# Patient Record
Sex: MALE | Race: WHITE | Employment: UNEMPLOYED | URBAN - METROPOLITAN AREA
[De-identification: names, ages, dates, MRNs, and addresses within clinical notes are randomized per-mention and may not be internally consistent; named-entity substitution may affect disease eponyms.]

---

## 2017-12-07 ENCOUNTER — APPOINTMENT (OUTPATIENT)
Dept: MRI IMAGING | Age: 42
End: 2017-12-07
Payer: MEDICARE

## 2017-12-07 ENCOUNTER — APPOINTMENT (OUTPATIENT)
Dept: GENERAL RADIOLOGY | Age: 42
End: 2017-12-07
Payer: MEDICARE

## 2017-12-07 ENCOUNTER — HOSPITAL ENCOUNTER (EMERGENCY)
Age: 42
Discharge: HOME | End: 2017-12-07
Attending: EMERGENCY MEDICINE
Payer: MEDICARE

## 2017-12-07 VITALS
HEART RATE: 94 BPM | BODY MASS INDEX: 30.81 KG/M2 | SYSTOLIC BLOOD PRESSURE: 138 MMHG | WEIGHT: 240 LBS | RESPIRATION RATE: 16 BRPM | OXYGEN SATURATION: 100 % | DIASTOLIC BLOOD PRESSURE: 84 MMHG | TEMPERATURE: 97.6 F

## 2017-12-07 DIAGNOSIS — M54.30 SCIATICA, UNSPECIFIED LATERALITY: Primary | ICD-10-CM

## 2017-12-07 PROCEDURE — 72146 MRI CHEST SPINE W/O DYE: CPT

## 2017-12-07 PROCEDURE — 96372 THER/PROPH/DIAG INJ SC/IM: CPT

## 2017-12-07 PROCEDURE — 6360000002 HC RX W HCPCS: Performed by: PHYSICIAN ASSISTANT

## 2017-12-07 PROCEDURE — 72100 X-RAY EXAM L-S SPINE 2/3 VWS: CPT

## 2017-12-07 PROCEDURE — 72148 MRI LUMBAR SPINE W/O DYE: CPT

## 2017-12-07 PROCEDURE — 99284 EMERGENCY DEPT VISIT MOD MDM: CPT

## 2017-12-07 PROCEDURE — 72072 X-RAY EXAM THORAC SPINE 3VWS: CPT

## 2017-12-07 PROCEDURE — 6370000000 HC RX 637 (ALT 250 FOR IP): Performed by: EMERGENCY MEDICINE

## 2017-12-07 RX ORDER — KETOROLAC TROMETHAMINE 30 MG/ML
60 INJECTION, SOLUTION INTRAMUSCULAR; INTRAVENOUS ONCE
Status: COMPLETED | OUTPATIENT
Start: 2017-12-07 | End: 2017-12-07

## 2017-12-07 RX ORDER — ORPHENADRINE CITRATE 30 MG/ML
60 INJECTION INTRAMUSCULAR; INTRAVENOUS ONCE
Status: COMPLETED | OUTPATIENT
Start: 2017-12-07 | End: 2017-12-07

## 2017-12-07 RX ORDER — HYDROMORPHONE HCL 110MG/55ML
1 PATIENT CONTROLLED ANALGESIA SYRINGE INTRAVENOUS ONCE
Status: COMPLETED | OUTPATIENT
Start: 2017-12-07 | End: 2017-12-07

## 2017-12-07 RX ORDER — DEXAMETHASONE SODIUM PHOSPHATE 10 MG/ML
10 INJECTION INTRAMUSCULAR; INTRAVENOUS ONCE
Status: COMPLETED | OUTPATIENT
Start: 2017-12-07 | End: 2017-12-07

## 2017-12-07 RX ORDER — DEXAMETHASONE SODIUM PHOSPHATE 4 MG/ML
10 INJECTION, SOLUTION INTRA-ARTICULAR; INTRALESIONAL; INTRAMUSCULAR; INTRAVENOUS; SOFT TISSUE ONCE
Status: DISCONTINUED | OUTPATIENT
Start: 2017-12-07 | End: 2017-12-07 | Stop reason: SDUPTHER

## 2017-12-07 RX ORDER — LIDOCAINE 50 MG/G
1 PATCH TOPICAL ONCE
Status: DISCONTINUED | OUTPATIENT
Start: 2017-12-07 | End: 2017-12-07 | Stop reason: HOSPADM

## 2017-12-07 RX ORDER — METHYLPREDNISOLONE 4 MG/1
TABLET ORAL
Qty: 1 KIT | Refills: 0 | Status: SHIPPED | OUTPATIENT
Start: 2017-12-07

## 2017-12-07 RX ORDER — TRAMADOL HYDROCHLORIDE 50 MG/1
50 TABLET ORAL EVERY 6 HOURS PRN
Qty: 20 TABLET | Refills: 0 | Status: SHIPPED | OUTPATIENT
Start: 2017-12-07 | End: 2017-12-17

## 2017-12-07 RX ADMIN — DEXAMETHASONE SODIUM PHOSPHATE 10 MG: 10 INJECTION INTRAMUSCULAR; INTRAVENOUS at 16:56

## 2017-12-07 RX ADMIN — ORPHENADRINE CITRATE 60 MG: 30 INJECTION INTRAMUSCULAR; INTRAVENOUS at 16:00

## 2017-12-07 RX ADMIN — KETOROLAC TROMETHAMINE 60 MG: 30 INJECTION, SOLUTION INTRAMUSCULAR at 16:00

## 2017-12-07 RX ADMIN — HYDROMORPHONE HYDROCHLORIDE 1 MG: 2 INJECTION, SOLUTION INTRAMUSCULAR; INTRAVENOUS; SUBCUTANEOUS at 16:55

## 2017-12-07 ASSESSMENT — PAIN SCALES - GENERAL
PAINLEVEL_OUTOF10: 10
PAINLEVEL_OUTOF10: 10
PAINLEVEL_OUTOF10: 8

## 2017-12-07 ASSESSMENT — PAIN DESCRIPTION - ORIENTATION: ORIENTATION: RIGHT;LOWER

## 2017-12-07 ASSESSMENT — PAIN DESCRIPTION - FREQUENCY: FREQUENCY: CONTINUOUS

## 2017-12-07 ASSESSMENT — PAIN DESCRIPTION - PAIN TYPE: TYPE: ACUTE PAIN

## 2017-12-07 ASSESSMENT — PAIN DESCRIPTION - DESCRIPTORS: DESCRIPTORS: PRESSURE;SHARP;SHOOTING;STABBING

## 2017-12-07 ASSESSMENT — PAIN DESCRIPTION - LOCATION: LOCATION: BACK

## 2017-12-07 NOTE — ED PROVIDER NOTES
[address]  eMERGENCY dEPARTMENT eNCOUnter      Pt Name: Alejandra Mcginnis  MRN: [id number]  [place] 1975  Date of evaluation: 12/7/2017  Provider: Radha Garcia PA-C    CHIEF COMPLAINT       Chief Complaint   Patient presents with    Back Pain     right lower back           HISTORY OF PRESENT ILLNESS  (Location/Symptom, Timing/Onset, Context/Setting, Quality, Duration, Modifying Factors, Severity.)   Alejandra Mcginnis is a 40 y.o. male who presents to the emergency department via EMS With complaints of lower back pain. Patient states this morning at 7:15 he coughed and felt a pop in his lower back. Patient states he had immediate onset pain in his thoracic and lumbar region that radiates into his right hip and down his leg. Patient states he is unable to bear any weight on the right leg as it will give out on him medially. Patient does have a history of back pain and states he has had this before however this is more severe. Patient denies any fever, chills, chest pain, shortness breath, nausea, vomiting, abdominal pain, loss of bowel or bladder control, numbness, tingling. No IV drug use. Patient is not diabetic. Patient did not take anything for pain today. No other complaints. Patient has a history of chronic back pain. No loss of bowel or bladder control, no numbness or tingling  Patient denies any history of IV drug use, denies any fevers, chills, abdominal pain nausea or vomiting    Location/Symptom: low back  Quality: Aching  Duration: Persistent  Modifying Factors: Worse with bending and twisting  Severity: 10/10    Nursing Notes were reviewed. REVIEW OF SYSTEMS    (2-9 systems for level 4, 10 or more for level 5)     Review of Systems   Constitutional: Negative. Respiratory: Negative. Cardiovascular: Negative. Gastrointestinal: Negative. Musculoskeletal: Complaining of back pain  Genitourinary: Negative. Skin: Negative. Neurological: Negative.      Except as noted above the remainder of the review of systems was reviewed and negative. PAST MEDICAL HISTORY         Diagnosis Date    Herniated disc     C3-C4    Hx of musculoskeletal disorder      None otherwise stated in nurses notes    SURGICAL HISTORY           Procedure Laterality Date    CERVICAL FUSION  06/08/2016    anterior decompression C5/6, C6/7, C7/T    FRACTURE SURGERY      HERNIA REPAIR      KNEE ARTHROSCOPY      Rt knee    OTHER SURGICAL HISTORY  6/8/2016    Discectomy and fusion C4-5-6-7-T1, Corpectomy C5-7    SHOULDER SURGERY      Lt bicep tear    TONSILLECTOMY       None otherwise stated in nurses notes    CURRENT MEDICATIONS       Discharge Medication List as of 12/7/2017  8:17 PM      CONTINUE these medications which have NOT CHANGED    Details   oxyCODONE-acetaminophen (PERCOCET) 5-325 MG per tablet 1 tabs PO every 8 hours as needed for pain, Disp-75 tablet, R-0             ALLERGIES     Codeine phosphate and Pcn [penicillins]    FAMILY HISTORY           Problem Relation Age of Onset    High Cholesterol Father      Family Status   Relation Status    Mother Alive    Father Alive    Sister Alive      None otherwise stated in nurses notes    SOCIAL HISTORY      reports that he quit smoking about 18 months ago. His smoking use included Cigarettes. He smoked 0.30 packs per day. He has never used smokeless tobacco. He reports that he does not drink alcohol or use drugs. Lives at home with others      PHYSICAL EXAM    (up to 7 for level 4, 8 or more for level 5)     ED Triage Vitals [12/07/17 1533]   BP Temp Temp Source Pulse Resp SpO2 Height Weight   (!) 142/78 97.6 °F (36.4 °C) Oral 82 16 100 % -- 240 lb (108.9 kg)       Physical Exam   Nursing note and vitals reviewed. Constitutional: Oriented to person, place, and time and well-developed, well-nourished  Head: Normocephalic and atraumatic. Ear: External ears normal.   Nose: Nose normal and midline.    Eyes: Conjunctivae and EOM are normal. Pupils are equal, round, and reactive to light. Neck: Normal range of motion. Cardiovascular: Normal rate, regular rhythm, normal heart sounds and intact distal pulses. Pulmonary/Chest: Effort normal and breath sounds normal. No respiratory distress. No wheezes. No rales. No chest tenderness. Abdomen:  Soft, non-tender  Musculoskeletal: Normal range of motion. Mild paraspinal muscle tenderness over right lumbar spine and buttock, mild midline tenderness over thoracic and lumbar spine with no point tenderness, no step-off deformity, no swelling, no rashes. Pt unable to bear weight. Neurological: Alert and oriented to person, place, and time. GCS score is 15.  5/5 strength in bilateral lower extremities with sensation to light touch intact. Proprioception intact. Downgoing babinski. Skin: Skin is warm and dry. No rash noted. No erythema. No pallor. DIAGNOSTIC RESULTS   RADIOLOGY:   All plain film, CT, MRI, and formal ultrasound images (except ED bedside ultrasound) are read by the radiologist and the images and interpretations are directly viewed by the emergency physician. MRI LUMBAR SPINE WO CONTRAST   Final Result   Minimal circumferential disc bulging at L4-L5 associated with facet arthrosis   producing mild-to-moderate bilateral neural foraminal stenosis. The spinal   canal is patent throughout. L5-S1 2-3 mm isthmic anterolisthesis associated with spondylitic changes. MRI THORACIC SPINE WO CONTRAST   Final Result   No acute abnormality. XR LUMBAR SPINE (2-3 VIEWS)   Final Result   Partially lumbarized S1 vertebral body with questionable pars   interarticularis fracture/defect with possible 2 mm anterolisthesis L5 on S1. There is also suggestion of L4-L5 and L5-S1 mild degenerative disc disease. MRI could be obtained for better assessment if clinically indicated.          XR THORACIC SPINE (3 VIEWS)   Final Result   No plain film evidence of acute thoracic and lumbar spine. PT was signed out to Dr. Octavia Barriga and Melani Davis NP . Patient instructed to return to the emergency room if symptoms worsen, return, or any other concern right away which is agreed. Follow up with PCP in 2-3 days for re-evaluation. The patient presents with low back pain without signs of spinal cord compression, cauda equina syndrome, infection, aneurysm or other serious etiology. The patient is neurologically intact. Given the extremely low risk of these diagnoses further testing and evaluation for these possibilities does not appear to be indicated at this time. The patient has been instructed to return if the symptoms worsen or change in any way.          FINAL IMPRESSION      1.  Sciatica, unspecified laterality          DISPOSITION/PLAN   DISPOSITION Decision To Discharge    PATIENT REFERRED TO:  Darius Valencia MD  [address] [address]  [phone]    Call today        DISCHARGE MEDICATIONS:  Discharge Medication List as of 12/7/2017  8:17 PM      START taking these medications    Details   methylPREDNISolone (MEDROL, ANDREY,) 4 MG tablet Take by mouth., Disp-1 kit, R-0Print      traMADol (ULTRAM) 50 MG tablet Take 1 tablet by mouth every 6 hours as needed for Pain ., Disp-20 tablet, R-0Print             (Please note that portions of this note were completed with a voice recognition program.  Efforts were made to edit the dictations but occasionally words are mis-transcribed.)    Jillyn Carrel, PA-C Jillyn Carrel, PA-C  12/07/17 [address] [facility] Ghassan, MAURISIO  12/10/17 [address] [facility] MAURISIO Ferrell  12/10/17 [address] [facility] Ghassan, MAURISIO  12/14/17 [id number] [facility] MAURISIO Ferrell  12/21/17 1415

## 2017-12-07 NOTE — ED PROVIDER NOTES
THORACIC SPINE WO CONTRAST (Final result)   Result time 12/07/17 19:32:52   Final result by Larry Talley MD (12/07/17 19:32:52)                Impression:    No acute abnormality. Narrative:    EXAMINATION:  MRI OF THE THORACIC SPINE WITHOUT CONTRAST  12/7/2017 7:18 pm    TECHNIQUE:  Multiplanar multisequence MRI of the thoracic spine was performed without the  administration of intravenous contrast.    COMPARISON:  MRI thoracic spine from 09/27/2016    HISTORY:  ORDERING SYSTEM PROVIDED HISTORY: coughed at 715am felt pop in lower back. pain that radiates down right leg.  xray showed possible pars  interarticularis fracture at L5  TECHNOLOGIST PROVIDED HISTORY:  Ordering Physician Provided Reason for Exam: pt c/o back pain after coughing  Acuity: Acute  Type of Exam: Initial    FINDINGS:  BONES/ALIGNMENT: There is normal alignment of the spine. The vertebral body  heights are maintained. The bone marrow signal appears unremarkable. Orly Crisostomo  has been a prior anterior fusion from C4-T1. SPINAL CORD: No abnormal cord signal is seen. SOFT TISSUES: No paraspinal mass identified. DEGENERATIVE CHANGES: There is mild degenerative disease in the mid and lower  thoracic spine.  A small right paracentral disc protrusion at T8-9 effaces  the ventral thecal sac but does not compress the cord.  This is unchanged  since 2016.  There is no significant spinal canal stenosis.         XR THORACIC SPINE (3 VIEWS) (Final result)   Result time 12/07/17 16:43:24   Final result by Zion Gordon MD (12/07/17 16:43:24)                Impression:    No plain film evidence of acute fracture or dislocation. Narrative:    EXAMINATION:  3 VIEWS OF THE THORACIC SPINE    12/7/2017 3:58 pm    COMPARISON:  None.     HISTORY:  ORDERING SYSTEM PROVIDED HISTORY: coughed, felt pop. [place] Else down right leg  TECHNOLOGIST PROVIDED HISTORY:  Reason for exam:->coughed, felt pop. [place] Else down right leg  Ordering Physician [facility]

## 2017-12-08 NOTE — ED NOTES
Pt given instructions for follow-up and discharge. Pt given education on steroids and tramadol. Pt verbalizes understanding. Pt is A&O x4, PWD, eupneic, and no distress noted. Pt wheeled out to vehicle by ED staff d/t pain with walking.       Faustina Llamas RN  12/07/17 2030

## 2017-12-18 ENCOUNTER — OFFICE VISIT (OUTPATIENT)
Dept: NEUROSURGERY | Age: 42
End: 2017-12-18
Payer: MEDICARE

## 2017-12-18 VITALS
HEIGHT: 74 IN | HEART RATE: 89 BPM | BODY MASS INDEX: 31.7 KG/M2 | SYSTOLIC BLOOD PRESSURE: 118 MMHG | DIASTOLIC BLOOD PRESSURE: 77 MMHG | WEIGHT: 247 LBS

## 2017-12-18 DIAGNOSIS — M54.2 NECK PAIN: ICD-10-CM

## 2017-12-18 DIAGNOSIS — G95.9 CERVICAL MYELOPATHY (HCC): Primary | ICD-10-CM

## 2017-12-18 DIAGNOSIS — S39.012A LUMBAR STRAIN, INITIAL ENCOUNTER: ICD-10-CM

## 2017-12-18 PROCEDURE — G8427 DOCREV CUR MEDS BY ELIG CLIN: HCPCS | Performed by: NEUROLOGICAL SURGERY

## 2017-12-18 PROCEDURE — G8417 CALC BMI ABV UP PARAM F/U: HCPCS | Performed by: NEUROLOGICAL SURGERY

## 2017-12-18 PROCEDURE — 1036F TOBACCO NON-USER: CPT | Performed by: NEUROLOGICAL SURGERY

## 2017-12-18 PROCEDURE — G8484 FLU IMMUNIZE NO ADMIN: HCPCS | Performed by: NEUROLOGICAL SURGERY

## 2017-12-18 PROCEDURE — 99214 OFFICE O/P EST MOD 30 MIN: CPT | Performed by: NEUROLOGICAL SURGERY

## 2017-12-18 RX ORDER — PREDNISONE 20 MG/1
60 TABLET ORAL DAILY
Qty: 21 TABLET | Refills: 0 | OUTPATIENT
Start: 2017-12-18 | End: 2017-12-25

## 2017-12-18 RX ORDER — TRAMADOL HYDROCHLORIDE 50 MG/1
50 TABLET ORAL EVERY 6 HOURS PRN
COMMUNITY

## 2017-12-18 NOTE — PROGRESS NOTES
[address]  [facility] # [facility]. [id number]  Dept: [phone]    Patient: Stan Muro  YOB: 1975  Date: 12/18/17    The patient is a 40 y.o. male who presents today for consult of the following problems:     Chief Complaint   Patient presents with    Neck Pain    Back Pain    Extremity Weakness             HPI:     Stan Muro is a 40 y.o. male on whom neurosurgical consultation was requested by No primary care provider on file. for management of Neck pain and persistent arm numbness and weakness as well as back pain. The patient is actually scheduled for the relief of lifting restrictions and allow once for return to some type of work. However over the course of the last week he has developed some mid back pain that radiates just proximal buttocks on the right side from the distal lower showing numbness tingling weakness or incontinence or saddle anesthesia. He states that at baseline he still has a significant amount of neck pain along with some weakness and numbness of his left upper extremity which he states is never significantly improved. He did say that he has some resolution of the left arm. That initially after surgery but he still has significant issues with his left arm. He states that he had an episode where he was trying to clean fascia and had difficulty with coordination using the left arm. Denies any traumas or falls of any kind.       History:     Past Medical History:   Diagnosis Date    Herniated disc     C3-C4    Hx of musculoskeletal disorder      Past Surgical History:   Procedure Laterality Date    CERVICAL FUSION  06/08/2016    anterior decompression C5/6, C6/7, C7/T    FRACTURE SURGERY      HERNIA REPAIR      KNEE ARTHROSCOPY      Rt knee    OTHER SURGICAL HISTORY  6/8/2016    Discectomy and fusion C4-5-6-7-T1, Corpectomy C5-7    SHOULDER SURGERY      Lt bicep tear    TONSILLECTOMY       Family History   Problem Relation Age of Onset    High Cholesterol Father      Current Outpatient Prescriptions on File Prior to Visit   Medication Sig Dispense Refill    methylPREDNISolone (MEDROL, ANDREY,) 4 MG tablet Take by mouth. 1 kit 0    oxyCODONE-acetaminophen (PERCOCET) 5-325 MG per tablet 1 tabs PO every 8 hours as needed for pain 75 tablet 0     No current facility-administered medications on file prior to visit. Social History   Substance Use Topics    Smoking status: Former Smoker     Packs/day: 0.30     Types: Cigarettes     Quit date: 6/7/2016    Smokeless tobacco: Never Used    Alcohol use No       Allergies   Allergen Reactions    Codeine Phosphate     Pcn [Penicillins]        Review of Systems  Constitutional: Negative for activity change and appetite change. HENT: Negative for ear pain and facial swelling. Eyes: Negative for discharge and itching. Respiratory: Negative for choking and chest tightness. Cardiovascular: Negative for chest pain and leg swelling. Gastrointestinal: Negative for nausea and abdominal pain. Endocrine: Negative for cold intolerance and heat intolerance. Genitourinary: Negative for frequency and flank pain. Musculoskeletal: Negative for myalgias and joint swelling. Skin: Negative for rash and wound. Allergic/Immunologic: Negative for environmental allergies and food allergies. Hematological: Negative for adenopathy. Does not bruise/bleed easily. Psychiatric/Behavioral: Negative for self-injury. The patient is not nervous/anxious. Physical Exam:      /77 (Site: Right Arm, Position: Sitting, Cuff Size: Small Adult)   Pulse 89   Ht 6' 2\" (1.88 m)   Wt 247 lb (112 kg)   BMI 31.71 kg/m²   Estimated body mass index is 31.71 kg/m² as calculated from the following:    Height as of this encounter: 6' 2\" (1.88 m). Weight as of this encounter: 247 lb (112 kg).     General:  Nicolas Escobar is a 40y.o. year old will be sent for function capacity assessment after this. We will see how he is doing in a few weeks and he will call the office when he feels better. Followup: No Follow-up on file. Prescriptions Ordered:  No orders of the defined types were placed in this encounter. Orders Placed:  No orders of the defined types were placed in this encounter. Electronically signed by Adama Adkins DO on 12/18/2017 at 11:30 AM    Please note that this chart was generated using voice recognition Dragon dictation software. Although every effort was made to ensure the accuracy of this automated transcription, some errors in transcription may have occurred.

## 2018-01-16 ENCOUNTER — TELEPHONE (OUTPATIENT)
Dept: NEUROSURGERY | Age: 43
End: 2018-01-16

## 2018-01-16 DIAGNOSIS — M54.9 BACK PAIN, UNSPECIFIED BACK LOCATION, UNSPECIFIED BACK PAIN LATERALITY, UNSPECIFIED CHRONICITY: ICD-10-CM

## 2018-01-16 DIAGNOSIS — M54.2 NECK PAIN: Primary | ICD-10-CM

## 2018-01-25 ENCOUNTER — HOSPITAL ENCOUNTER (EMERGENCY)
Age: 43
Discharge: HOME | End: 2018-01-25
Attending: EMERGENCY MEDICINE
Payer: MEDICARE

## 2018-01-25 VITALS
HEART RATE: 83 BPM | DIASTOLIC BLOOD PRESSURE: 70 MMHG | BODY MASS INDEX: 30.8 KG/M2 | SYSTOLIC BLOOD PRESSURE: 124 MMHG | WEIGHT: 240 LBS | RESPIRATION RATE: 16 BRPM | TEMPERATURE: 98 F | OXYGEN SATURATION: 98 % | HEIGHT: 74 IN

## 2018-01-25 DIAGNOSIS — H10.31 ACUTE BACTERIAL CONJUNCTIVITIS OF RIGHT EYE: Primary | ICD-10-CM

## 2018-01-25 PROCEDURE — 6370000000 HC RX 637 (ALT 250 FOR IP): Performed by: PHYSICIAN ASSISTANT

## 2018-01-25 PROCEDURE — 99282 EMERGENCY DEPT VISIT SF MDM: CPT

## 2018-01-25 RX ORDER — TETRACAINE HYDROCHLORIDE 5 MG/ML
1 SOLUTION OPHTHALMIC ONCE
Status: COMPLETED | OUTPATIENT
Start: 2018-01-25 | End: 2018-01-25

## 2018-01-25 RX ORDER — POLYMYXIN B SULFATE AND TRIMETHOPRIM 1; 10000 MG/ML; [USP'U]/ML
1 SOLUTION OPHTHALMIC EVERY 4 HOURS
Qty: 10 ML | Refills: 0 | Status: SHIPPED | OUTPATIENT
Start: 2018-01-25 | End: 2018-02-04

## 2018-01-25 RX ADMIN — TETRACAINE HYDROCHLORIDE 1 DROP: 5 SOLUTION OPHTHALMIC at 09:44

## 2018-01-25 RX ADMIN — FLUORESCEIN SODIUM 1 STRIP: 0.6 STRIP OPHTHALMIC at 09:44

## 2018-01-25 ASSESSMENT — VISUAL ACUITY
OS: 20/20
OD: 20/70
OU: 20/20

## 2018-01-25 ASSESSMENT — ENCOUNTER SYMPTOMS
BLIND SPOTS: 0
CRUSTING: 1
PHOTOPHOBIA: 0
EYE PAIN: 1
EYE WATERING: 1
EYE REDNESS: 1
EYE DISCHARGE: 1

## 2018-01-25 ASSESSMENT — PAIN SCALES - GENERAL: PAINLEVEL_OUTOF10: 6

## 2018-01-25 NOTE — ED PROVIDER NOTES
film, CT, MRI, and formal ultrasound images (except ED bedside ultrasound) are read by the radiologist and the images and interpretations are directly viewed by the emergency physician. LABS: All lab results were reviewed by myself, and all abnormals are listed below. Labs Reviewed - No data to display      EMERGENCY DEPARTMENT COURSE:   Vitals:    Vitals:    01/25/18 0908   BP: 124/70   Pulse: 83   Resp: 16   Temp: 98 °F (36.7 °C)   SpO2: 98%   Weight: 240 lb (108.9 kg)   Height: 6' 1.5\" (1.867 m)       The patient was given the following medications while in the emergency department:  Orders Placed This Encounter   Medications    tetracaine (TETRAVISC) 0.5 % ophthalmic solution 1 drop    DISCONTD: fluorescein ophthalmic strip 1 mg    fluorescein ophthalmic strip 1 strip    trimethoprim-polymyxin b (POLYTRIM) 03615-9.1 UNIT/ML-% ophthalmic solution     Sig: Place 1 drop into both eyes every 4 hours for 10 days     Dispense:  10 mL     Refill:  0       -------------------------      CRITICAL CARE    CONSULTS:  None    PROCEDURES:  Procedures    FINAL IMPRESSION      1.  Acute bacterial conjunctivitis of right eye          DISPOSITION/PLAN   DISPOSITION Decision To Discharge 01/25/2018 09:50:42 AM      PATIENT REFERRED TO:  Shari Avila MD  [facility] [address] [address]  [phone]    Schedule an appointment as soon as possible for a visit in 1 day      [address] [place] [address]  [phone]    If symptoms worsen      DISCHARGE MEDICATIONS:  Discharge Medication List as of 1/25/2018  9:52 AM      START taking these medications    Details   trimethoprim-polymyxin b (POLYTRIM) 82552-6.1 UNIT/ML-% ophthalmic solution Place 1 drop into both eyes every 4 hours for 10 days, Disp-10 mL, R-0Print             (Please note that portions of this note were completed with a voice recognition program.  Efforts were made to edit the dictations but

## 2018-02-07 ENCOUNTER — HOSPITAL ENCOUNTER (OUTPATIENT)
Dept: PHYSICAL THERAPY | Facility: CLINIC | Age: 43
Setting detail: THERAPIES SERIES
Discharge: HOME | End: 2018-02-07
Payer: MEDICARE

## 2018-02-07 PROCEDURE — 97750 PHYSICAL PERFORMANCE TEST: CPT

## 2018-02-19 ENCOUNTER — OFFICE VISIT (OUTPATIENT)
Dept: NEUROSURGERY | Age: 43
End: 2018-02-19
Payer: MEDICARE

## 2018-02-19 VITALS
BODY MASS INDEX: 32.28 KG/M2 | DIASTOLIC BLOOD PRESSURE: 79 MMHG | SYSTOLIC BLOOD PRESSURE: 117 MMHG | HEART RATE: 91 BPM | WEIGHT: 248 LBS

## 2018-02-19 DIAGNOSIS — M48.02 STENOSIS OF CERVICAL SPINE WITH MYELOPATHY (HCC): Primary | ICD-10-CM

## 2018-02-19 DIAGNOSIS — G99.2 STENOSIS OF CERVICAL SPINE WITH MYELOPATHY (HCC): Primary | ICD-10-CM

## 2018-02-19 PROCEDURE — G8484 FLU IMMUNIZE NO ADMIN: HCPCS | Performed by: NEUROLOGICAL SURGERY

## 2018-02-19 PROCEDURE — G8427 DOCREV CUR MEDS BY ELIG CLIN: HCPCS | Performed by: NEUROLOGICAL SURGERY

## 2018-02-19 PROCEDURE — 99214 OFFICE O/P EST MOD 30 MIN: CPT | Performed by: NEUROLOGICAL SURGERY

## 2018-02-19 PROCEDURE — 1036F TOBACCO NON-USER: CPT | Performed by: NEUROLOGICAL SURGERY

## 2018-02-19 PROCEDURE — G8417 CALC BMI ABV UP PARAM F/U: HCPCS | Performed by: NEUROLOGICAL SURGERY

## 2018-02-19 NOTE — PROGRESS NOTES
I saw and examined the patient in the office today. The patient states that he has no significant residual numbness tingling of his arms and has minimal neck pain with motion. 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 He denies any new falls or any other new trauma. Vital signs stable  Cranial nerves II through XII are intact. 5 out of 5 bilateral biceps triceps deltoid .  5 out of 5 bilateral lower extremities. Sensation intact light touch. I reviewed the patient's FCE in detail with him. It appears that he was able to tolerate medium level of work load for an 8 hour work day. Maxim safe level performance occurred at around 27 pounds for most tasks. I had a lengthy discussion with the patient who stated that he really is desiring to go back to work in a similar type of work he did which would involve anywhere from 20-50 pounds of lifting bending twisting. He said this would involve these motions approximate 1-2 times per hour. I had a lengthy discussion with him and showed him the imaging as well as results of the FCE. From a neurosurgical standpoint I can speak regarding his cervical spine which shows that he has had multiple level fusion from C3 all the way down to C6 or C7 ultimately he does not have any adjacent level disease or central stenosis or obvious motor deficits at this time. Considering how long it has been since his surgery as well as the radiographic and clinical outcome I really am not concerned from a safety standpoint that he will cause any major disruption or has any chronic instability that will prevent him from doing any kind of non-impact work. I explained to him that he has nature of work being labor puts any individual even 1 who has had no surgery or no baseline disease at increased risk of degenerative disease in the long run.   The

## 2025-06-08 ENCOUNTER — HOSPITAL ENCOUNTER (EMERGENCY)
Age: 50
Discharge: HOME | End: 2025-06-08
Attending: EMERGENCY MEDICINE

## 2025-06-08 ENCOUNTER — APPOINTMENT (OUTPATIENT)
Dept: CT IMAGING | Age: 50
End: 2025-06-08

## 2025-06-08 VITALS
OXYGEN SATURATION: 100 % | HEART RATE: 80 BPM | DIASTOLIC BLOOD PRESSURE: 84 MMHG | RESPIRATION RATE: 22 BRPM | TEMPERATURE: 97.8 F | SYSTOLIC BLOOD PRESSURE: 115 MMHG
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
## 2025-06-08 PROCEDURE — 6370000000 HC RX 637 (ALT 250 FOR IP)

## 2025-06-08 PROCEDURE — 70450 CT HEAD/BRAIN W/O DYE: CPT

## 2025-06-08 PROCEDURE — 99284 EMERGENCY DEPT VISIT MOD MDM: CPT

## 2025-06-08 RX ORDER — ACETAMINOPHEN 500 MG
1000 TABLET ORAL ONCE
Status: COMPLETED | OUTPATIENT
Start: 2025-06-08 | End: 2025-06-08

## 2025-06-08 RX ADMIN — ACETAMINOPHEN 1000 MG: 500 TABLET ORAL at 02:02
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
## 2025-06-09 ENCOUNTER — TELEPHONE (OUTPATIENT)
Age: 50
End: 2025-06-09
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
## 2025-06-19 ENCOUNTER — OFFICE VISIT (OUTPATIENT)
Age: 50
End: 2025-06-19

## 2025-06-19 VITALS
HEART RATE: 73 BPM | BODY MASS INDEX: 31.83 KG/M2 | SYSTOLIC BLOOD PRESSURE: 124 MMHG | DIASTOLIC BLOOD PRESSURE: 73 MMHG | HEIGHT: 74 IN | WEIGHT: 248 LBS

## 2025-06-19 DIAGNOSIS — Z18.9 RETAINED FOREIGN BODY FRAGMENT: Primary | ICD-10-CM

## 2025-06-19 PROCEDURE — 99202 OFFICE O/P NEW SF 15 MIN: CPT | Performed by: SURGERY

## 2025-06-19 ASSESSMENT — ENCOUNTER SYMPTOMS
ALLERGIC/IMMUNOLOGIC NEGATIVE: 1
GASTROINTESTINAL NEGATIVE: 1
EYES NEGATIVE: 1
RESPIRATORY NEGATIVE: 1
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
